# Patient Record
Sex: MALE | Race: WHITE | NOT HISPANIC OR LATINO | Employment: FULL TIME | URBAN - METROPOLITAN AREA
[De-identification: names, ages, dates, MRNs, and addresses within clinical notes are randomized per-mention and may not be internally consistent; named-entity substitution may affect disease eponyms.]

---

## 2018-11-13 ENCOUNTER — HOSPITAL ENCOUNTER (EMERGENCY)
Facility: HOSPITAL | Age: 38
Discharge: HOME | End: 2018-11-13
Attending: EMERGENCY MEDICINE | Admitting: EMERGENCY MEDICINE

## 2018-11-13 VITALS
RESPIRATION RATE: 16 BRPM | DIASTOLIC BLOOD PRESSURE: 82 MMHG | SYSTOLIC BLOOD PRESSURE: 129 MMHG | TEMPERATURE: 96.9 F | HEIGHT: 70 IN | HEART RATE: 65 BPM | OXYGEN SATURATION: 99 %

## 2018-11-13 DIAGNOSIS — S05.02XA ABRASION OF LEFT CORNEA, INITIAL ENCOUNTER: Primary | ICD-10-CM

## 2018-11-13 PROCEDURE — 99284 EMERGENCY DEPT VISIT MOD MDM: CPT

## 2018-11-13 PROCEDURE — 25010000002 TDAP 5-2.5-18.5 LF-MCG/0.5 SUSPENSION: Performed by: EMERGENCY MEDICINE

## 2018-11-13 PROCEDURE — 90471 IMMUNIZATION ADMIN: CPT | Performed by: EMERGENCY MEDICINE

## 2018-11-13 PROCEDURE — 90715 TDAP VACCINE 7 YRS/> IM: CPT | Performed by: EMERGENCY MEDICINE

## 2018-11-13 RX ORDER — TETRACAINE HYDROCHLORIDE 5 MG/ML
2 SOLUTION OPHTHALMIC ONCE
Status: COMPLETED | OUTPATIENT
Start: 2018-11-13 | End: 2018-11-13

## 2018-11-13 RX ORDER — KETOROLAC TROMETHAMINE 5 MG/ML
1 SOLUTION OPHTHALMIC 4 TIMES DAILY
Status: DISCONTINUED | OUTPATIENT
Start: 2018-11-13 | End: 2018-11-14 | Stop reason: HOSPADM

## 2018-11-13 RX ORDER — HYDROCODONE BITARTRATE AND ACETAMINOPHEN 7.5; 325 MG/1; MG/1
1 TABLET ORAL ONCE
Status: COMPLETED | OUTPATIENT
Start: 2018-11-13 | End: 2018-11-13

## 2018-11-13 RX ORDER — ERYTHROMYCIN 5 MG/G
1 OINTMENT OPHTHALMIC ONCE
Status: COMPLETED | OUTPATIENT
Start: 2018-11-13 | End: 2018-11-13

## 2018-11-13 RX ADMIN — HYDROCODONE BITARTRATE AND ACETAMINOPHEN 1 TABLET: 7.5; 325 TABLET ORAL at 22:05

## 2018-11-13 RX ADMIN — KETOROLAC TROMETHAMINE 1 DROP: 5 SOLUTION OPHTHALMIC at 22:24

## 2018-11-13 RX ADMIN — TETANUS TOXOID, REDUCED DIPHTHERIA TOXOID AND ACELLULAR PERTUSSIS VACCINE, ADSORBED 0.5 ML: 5; 2.5; 8; 8; 2.5 SUSPENSION INTRAMUSCULAR at 22:23

## 2018-11-13 RX ADMIN — TETRACAINE HYDROCHLORIDE 2 DROP: 5 SOLUTION OPHTHALMIC at 21:20

## 2018-11-13 RX ADMIN — Medication 1 STRIP: at 21:20

## 2018-11-13 RX ADMIN — ERYTHROMYCIN 1 APPLICATION: 5 OINTMENT OPHTHALMIC at 22:25

## 2018-11-14 NOTE — ED PROVIDER NOTES
" EMERGENCY DEPARTMENT ENCOUNTER    CHIEF COMPLAINT  Chief Complaint: eye pain  History given by: patient  History limited by: nothing  Room Number: 37/37  PMD: Lev Molina MD      HPI:  Pt is a 38 y.o. male who presents complaining of left eye pain that began earlier today. Pt states that he was attempting to throw a broken down cardboard box up into a garbage truck when one box did not make it into the truck. Pt states that the box fell from about 7 feet up and struck his left eye. Pt states that his vision is cloudy in his left eye and complains of photophobia. Pt states that he rinsed his eye out at an eye station and was sent to the ED from [facility] for further evaluation.    Duration:  Several hours  Onset: sudden  Timing: constant  Location: L eye  Radiation: N/A  Quality: \"pain\"  Intensity/Severity: severe  Progression: unchanged  Associated Symptoms: photophobia, cloudy vision in L eye  Aggravating Factors: opening eye, light  Alleviating Factors: none  Previous Episodes: Pt denies having similar symptoms previously.  Treatment before arrival: Pt states that he rinsed his eye out at an eye station and was sent to the ED from [facility] for further evaluation.    PAST MEDICAL HISTORY  Active Ambulatory Problems     Diagnosis Date Noted   • No Active Ambulatory Problems     Resolved Ambulatory Problems     Diagnosis Date Noted   • No Resolved Ambulatory Problems     Past Medical History:   Diagnosis Date   • SVT (supraventricular tachycardia) (CMS/HCC)        PAST SURGICAL HISTORY  Past Surgical History:   Procedure Laterality Date   • CARDIAC ABLATION         FAMILY HISTORY  History reviewed. No pertinent family history.    SOCIAL HISTORY  Social History     Socioeconomic History   • Marital status: Single     Spouse name: Not on file   • Number of children: Not on file   • Years of education: Not on file   • Highest education level: Not on file   Social Needs   • Financial resource strain: Not " on file   • Food insecurity - worry: Not on file   • Food insecurity - inability: Not on file   • Transportation needs - medical: Not on file   • Transportation needs - non-medical: Not on file   Occupational History   • Not on file   Tobacco Use   • Smoking status: Never Smoker   Substance and Sexual Activity   • Alcohol use: Yes     Comment: occas   • Drug use: Not on file   • Sexual activity: No   Other Topics Concern   • Not on file   Social History Narrative   • Not on file       ALLERGIES  Patient has no known allergies.    REVIEW OF SYSTEMS  Review of Systems   Constitutional: Negative for activity change, appetite change and fever.   Eyes: Positive for photophobia, pain (left) and visual disturbance (cloudy vision in left eye).   Respiratory: Negative for cough and shortness of breath.    Cardiovascular: Negative for chest pain and leg swelling.   Gastrointestinal: Negative for abdominal pain, diarrhea and vomiting.   Genitourinary: Negative for decreased urine volume and dysuria.   Musculoskeletal: Negative for neck pain.   Skin: Negative for rash and wound.   Neurological: Negative for weakness, numbness and headaches.   All other systems reviewed and are negative.      PHYSICAL EXAM  ED Triage Vitals   Temp Heart Rate Resp BP SpO2   11/13/18 2053 11/13/18 2053 11/13/18 2056 -- 11/13/18 2053   96.9 °F (36.1 °C) 59 12  99 %      Temp src Heart Rate Source Patient Position BP Location FiO2 (%)   11/13/18 2053 -- -- -- --   Tympanic           Physical Exam   Constitutional: No distress.   Pt appears uncomfortable   HENT:   Head: Normocephalic and atraumatic.   Eyes: EOM are normal. No foreign body present in the left eye. Left conjunctiva is injected.   Slit lamp exam:       The left eye shows corneal abrasion (large, centrally located over pupil and extends to the lateral aspect of iris) and fluorescein uptake. The left eye shows no foreign body.   Negative Alfonso's test   Neck: Normal range of motion.    Pulmonary/Chest: No respiratory distress.   Abdominal: There is no tenderness.   Musculoskeletal: He exhibits no edema.   Neurological: He is alert.   Skin: Skin is warm and dry.   Nursing note and vitals reviewed.    PROCEDURES  Procedures      PROGRESS AND CONSULTS     2115- Ordered tetracaine and fluorescein drops.    2122- Rechecked pt. Pt is resting comfortably. Notified pt of the large corneal abrasion seen on exam and that I do not see any evidence of a penetrating injury. Discussed the plan to check the pt's visual acuity and call Dr. Ramsey (ophthalmology). Pt understands and agrees with the plan, all questions answered.    2135- Per RN, the pt's visual acuity is 20/20 in right eye and 20/200 in left eye.     2135- Placed call to ophthalmology.    2136- Rechecked pt. Pt is resting comfortably. Notified pt of his visual acuity results. D/w pt and family that I do not see any foreign body on exam. Discussed the plan to call Dr. Ramsey (ophthalmology) prior to disposition. Pt understands and agrees with the plan, all questions answered.    2139- Discussed the pt's case with Dr. Adrianna Ramsey (ophthalmology), who agrees to see the pt in the office tomorrow at 1000. She requests that I prescribe abx and ketorolac drops, which I will do.    2147- Rechecked pt. Pt is resting comfortably. Notified pt of my discussion with Dr. Ramsey (ophthalmology) and the plan to update the pt's tetanus. Discussed the plan to discharge the pt home with prescriptions for pain medication, abx ointment and ketorolac drops. I instructed the pt to follow up with Dr. Ramsey tomorrow at 1000. Pt understands and agrees with the plan, all questions answered.    2154- Ordered Tdap for tetanus prevention. Ordered Norco for pain. Ordered ketorolac drops and erythromycin per Dr. Ramsey.     MEDICAL DECISION MAKING  Results were reviewed/discussed with the patient and they were also made aware of online access. Pt also made aware that follow up with PMD is  necessary.     MDM  Number of Diagnoses or Management Options  Abrasion of left cornea, initial encounter:      Amount and/or Complexity of Data Reviewed  Decide to obtain previous medical records or to obtain history from someone other than the patient: yes  Review and summarize past medical records: yes (Pt was seen at [facility] earlier today. It was noted that the staff at [facility] talked to Dr. Adrianna Ramsey (ophthalmology) and instructed the pt to come to [facility].)  Discuss the patient with other providers: yes (Dr. Adrianna Ramsey (ophthalmology))    Patient Progress  Patient progress: stable         DIAGNOSIS  Final diagnoses:   Abrasion of left cornea, initial encounter       DISPOSITION  DISCHARGE    Patient discharged in stable condition.    Reviewed implications of results, diagnosis, meds, responsibility to follow up, warning signs and symptoms of possible worsening, potential complications and reasons to return to ER.    Patient/Family voiced understanding of above instructions.    Discussed plan for discharge, as there is no emergent indication for admission. Patient referred to primary care provider for BP management due to today's BP. Pt/family is agreeable and understands need for follow up and repeat testing.  Pt is aware that discharge does not mean that nothing is wrong but it indicates no emergency is present that requires admission and they must continue care with follow-up as given below or physician of their choice.     FOLLOW-UP  Adrianna Ramsey MD  [address] [place], [place] [id number] [phone]    Go in 1 day  at 10AM         Medication List      No changes were made to your prescriptions during this visit.           Latest Documented Vital Signs:  As of 9:59 PM  BP- 132/91 HR- 62 Temp- 96.9 °F (36.1 °C) (Tympanic) O2 sat- 100%    --  Documentation assistance provided by martina Shirley for Dr. Parknison.  Information recorded by the martina was done at my  direction and has been verified and validated by me.     Echo Shirley  11/13/18 2157       Eduard Parkinson MD  11/14/18 2049

## 2018-11-14 NOTE — DISCHARGE INSTRUCTIONS
Use one drop of Ketorolac in the left eye every 6 hours. Use erythromycin ointment in left eye every 6 hours. Use the Ketorolac eye drops in the left eye before using the antibiotic ointment.

## 2018-11-14 NOTE — ED TRIAGE NOTES
To ER via PV sent from [facility].  Eye injury.  Cardboard from approx 7 ft and struck pt in left eye.  Sent for corneal abrasion, foreign body in eye, possible penetrating injury per [facility].  Injury occurred approx 1530 this afternoon.

## 2021-03-05 ENCOUNTER — IMMUNIZATION (OUTPATIENT)
Dept: VACCINE CLINIC | Facility: HOSPITAL | Age: 41
End: 2021-03-05

## 2021-03-05 PROCEDURE — 0001A: CPT | Performed by: INTERNAL MEDICINE

## 2021-03-05 PROCEDURE — 91300 HC SARSCOV02 VAC 30MCG/0.3ML IM: CPT | Performed by: INTERNAL MEDICINE

## 2021-03-26 ENCOUNTER — IMMUNIZATION (OUTPATIENT)
Dept: VACCINE CLINIC | Facility: HOSPITAL | Age: 41
End: 2021-03-26

## 2021-03-26 PROCEDURE — 0002A: CPT | Performed by: INTERNAL MEDICINE

## 2021-03-26 PROCEDURE — 91300 HC SARSCOV02 VAC 30MCG/0.3ML IM: CPT | Performed by: INTERNAL MEDICINE

## 2021-10-30 ENCOUNTER — IMMUNIZATION (OUTPATIENT)
Dept: VACCINE CLINIC | Facility: HOSPITAL | Age: 41
End: 2021-10-30

## 2021-10-30 PROCEDURE — 0004A ADM SARSCOV2 30MCG/0.3ML BOOSTER: CPT | Performed by: INTERNAL MEDICINE

## 2021-10-30 PROCEDURE — 91300 HC SARSCOV02 VAC 30MCG/0.3ML IM: CPT | Performed by: INTERNAL MEDICINE
